# Patient Record
Sex: FEMALE | Race: WHITE | Employment: UNEMPLOYED | URBAN - METROPOLITAN AREA
[De-identification: names, ages, dates, MRNs, and addresses within clinical notes are randomized per-mention and may not be internally consistent; named-entity substitution may affect disease eponyms.]

---

## 2017-04-01 LAB
ANTIBODY SCREEN, EXTERNAL: NEGATIVE
CHLAMYDIA, EXTERNAL: NEGATIVE
GRBS, EXTERNAL: POSITIVE
HBSAG, EXTERNAL: NEGATIVE
HIV, EXTERNAL: NEGATIVE
PLATELET CNT,   EXTERNAL: 346
RPR, EXTERNAL: NEGATIVE
RUBELLA, EXTERNAL: NORMAL
TYPE, ABO & RH, EXTERNAL: NORMAL
URINALYSIS, EXTERNAL: NEGATIVE

## 2017-05-01 ENCOUNTER — ANESTHESIA (OUTPATIENT)
Dept: LABOR AND DELIVERY | Age: 42
DRG: 560 | End: 2017-05-01
Payer: MEDICAID

## 2017-05-01 ENCOUNTER — ANESTHESIA EVENT (OUTPATIENT)
Dept: LABOR AND DELIVERY | Age: 42
DRG: 560 | End: 2017-05-01
Payer: MEDICAID

## 2017-05-01 ENCOUNTER — HOSPITAL ENCOUNTER (INPATIENT)
Age: 42
LOS: 2 days | Discharge: HOME | DRG: 560 | End: 2017-05-03
Attending: SPECIALIST | Admitting: OBSTETRICS & GYNECOLOGY
Payer: MEDICAID

## 2017-05-01 LAB
ABO + RH BLD: NORMAL
BASOPHILS # BLD AUTO: 0 K/UL (ref 0–0.06)
BASOPHILS # BLD: 0 % (ref 0–2)
BLOOD GROUP ANTIBODIES SERPL: NORMAL
DIFFERENTIAL METHOD BLD: ABNORMAL
EOSINOPHIL # BLD: 0.1 K/UL (ref 0–0.4)
EOSINOPHIL NFR BLD: 1 % (ref 0–5)
ERYTHROCYTE [DISTWIDTH] IN BLOOD BY AUTOMATED COUNT: 13.2 % (ref 11.6–14.5)
HCT VFR BLD AUTO: 39.1 % (ref 35–45)
HGB BLD-MCNC: 13.5 G/DL (ref 12–16)
LYMPHOCYTES # BLD AUTO: 22 % (ref 21–52)
LYMPHOCYTES # BLD: 2.2 K/UL (ref 0.9–3.6)
MCH RBC QN AUTO: 32.4 PG (ref 24–34)
MCHC RBC AUTO-ENTMCNC: 34.5 G/DL (ref 31–37)
MCV RBC AUTO: 93.8 FL (ref 74–97)
MONOCYTES # BLD: 0.4 K/UL (ref 0.05–1.2)
MONOCYTES NFR BLD AUTO: 4 % (ref 3–10)
NEUTS SEG # BLD: 7.2 K/UL (ref 1.8–8)
NEUTS SEG NFR BLD AUTO: 73 % (ref 40–73)
PLATELET # BLD AUTO: 317 K/UL (ref 135–420)
PMV BLD AUTO: 9.3 FL (ref 9.2–11.8)
RBC # BLD AUTO: 4.17 M/UL (ref 4.2–5.3)
SPECIMEN EXP DATE BLD: NORMAL
WBC # BLD AUTO: 9.9 K/UL (ref 4.6–13.2)

## 2017-05-01 PROCEDURE — 65270000029 HC RM PRIVATE

## 2017-05-01 PROCEDURE — 74011000250 HC RX REV CODE- 250

## 2017-05-01 PROCEDURE — 76060000078 HC EPIDURAL ANESTHESIA

## 2017-05-01 PROCEDURE — 74011250636 HC RX REV CODE- 250/636

## 2017-05-01 PROCEDURE — 3E0S3CZ INTRODUCE REGIONAL ANESTH IN EPIDURAL SPACE, PERC: ICD-10-PCS | Performed by: ANESTHESIOLOGY

## 2017-05-01 PROCEDURE — 77030020255 HC SOL INJ LR 1000ML BG

## 2017-05-01 PROCEDURE — 85025 COMPLETE CBC W/AUTO DIFF WBC: CPT | Performed by: OBSTETRICS & GYNECOLOGY

## 2017-05-01 PROCEDURE — 75410000003 HC RECOV DEL/VAG/CSECN EA 0.5 HR

## 2017-05-01 PROCEDURE — 74011000250 HC RX REV CODE- 250: Performed by: OBSTETRICS & GYNECOLOGY

## 2017-05-01 PROCEDURE — 74011250637 HC RX REV CODE- 250/637: Performed by: OBSTETRICS & GYNECOLOGY

## 2017-05-01 PROCEDURE — 10907ZC DRAINAGE OF AMNIOTIC FLUID, THERAPEUTIC FROM PRODUCTS OF CONCEPTION, VIA NATURAL OR ARTIFICIAL OPENING: ICD-10-PCS | Performed by: OBSTETRICS & GYNECOLOGY

## 2017-05-01 PROCEDURE — 84112 EVAL AMNIOTIC FLUID PROTEIN: CPT

## 2017-05-01 PROCEDURE — 75410000000 HC DELIVERY VAGINAL/SINGLE

## 2017-05-01 PROCEDURE — 77030019938 HC TBNG IV PCA ICUM -A

## 2017-05-01 PROCEDURE — 0KQM0ZZ REPAIR PERINEUM MUSCLE, OPEN APPROACH: ICD-10-PCS | Performed by: OBSTETRICS & GYNECOLOGY

## 2017-05-01 PROCEDURE — 75410000002 HC LABOR FEE PER 1 HR

## 2017-05-01 PROCEDURE — 86900 BLOOD TYPING SEROLOGIC ABO: CPT | Performed by: OBSTETRICS & GYNECOLOGY

## 2017-05-01 PROCEDURE — 74011000258 HC RX REV CODE- 258: Performed by: OBSTETRICS & GYNECOLOGY

## 2017-05-01 PROCEDURE — 74011250636 HC RX REV CODE- 250/636: Performed by: OBSTETRICS & GYNECOLOGY

## 2017-05-01 RX ORDER — LIDOCAINE HYDROCHLORIDE 10 MG/ML
30 INJECTION, SOLUTION EPIDURAL; INFILTRATION; INTRACAUDAL; PERINEURAL AS NEEDED
Status: DISCONTINUED | OUTPATIENT
Start: 2017-05-01 | End: 2017-05-01 | Stop reason: HOSPADM

## 2017-05-01 RX ORDER — CASTOR OIL 100 %
60 OIL (ML) ORAL
Status: COMPLETED | OUTPATIENT
Start: 2017-05-01 | End: 2017-05-01

## 2017-05-01 RX ORDER — ROPIVACAINE HYDROCHLORIDE 2 MG/ML
INJECTION, SOLUTION EPIDURAL; INFILTRATION; PERINEURAL AS NEEDED
Status: DISCONTINUED | OUTPATIENT
Start: 2017-05-01 | End: 2017-05-01 | Stop reason: HOSPADM

## 2017-05-01 RX ORDER — OXYCODONE AND ACETAMINOPHEN 5; 325 MG/1; MG/1
2 TABLET ORAL
Status: DISCONTINUED | OUTPATIENT
Start: 2017-05-01 | End: 2017-05-03 | Stop reason: HOSPADM

## 2017-05-01 RX ORDER — ONDANSETRON 2 MG/ML
4 INJECTION INTRAMUSCULAR; INTRAVENOUS
Status: DISCONTINUED | OUTPATIENT
Start: 2017-05-01 | End: 2017-05-01 | Stop reason: HOSPADM

## 2017-05-01 RX ORDER — IBUPROFEN 400 MG/1
800 TABLET ORAL
Status: DISCONTINUED | OUTPATIENT
Start: 2017-05-01 | End: 2017-05-03 | Stop reason: HOSPADM

## 2017-05-01 RX ORDER — MAG HYDROX/ALUMINUM HYD/SIMETH 200-200-20
15 SUSPENSION, ORAL (FINAL DOSE FORM) ORAL
Status: DISCONTINUED | OUTPATIENT
Start: 2017-05-01 | End: 2017-05-01 | Stop reason: HOSPADM

## 2017-05-01 RX ORDER — TERBUTALINE SULFATE 1 MG/ML
0.25 INJECTION SUBCUTANEOUS
Status: DISCONTINUED | OUTPATIENT
Start: 2017-05-01 | End: 2017-05-01 | Stop reason: HOSPADM

## 2017-05-01 RX ORDER — AMOXICILLIN 250 MG
1 CAPSULE ORAL
Status: DISCONTINUED | OUTPATIENT
Start: 2017-05-01 | End: 2017-05-03 | Stop reason: HOSPADM

## 2017-05-01 RX ORDER — OXYTOCIN/RINGER'S LACTATE 20/1000 ML
500 PLASTIC BAG, INJECTION (ML) INTRAVENOUS ONCE
Status: DISCONTINUED | OUTPATIENT
Start: 2017-05-01 | End: 2017-05-01 | Stop reason: HOSPADM

## 2017-05-01 RX ORDER — FENTANYL CITRATE 50 UG/ML
INJECTION, SOLUTION INTRAMUSCULAR; INTRAVENOUS
Status: DISPENSED
Start: 2017-05-01 | End: 2017-05-02

## 2017-05-01 RX ORDER — OXYTOCIN 10 [USP'U]/ML
10 INJECTION, SOLUTION INTRAMUSCULAR; INTRAVENOUS
Status: DISCONTINUED | OUTPATIENT
Start: 2017-05-01 | End: 2017-05-01 | Stop reason: HOSPADM

## 2017-05-01 RX ORDER — OXYTOCIN/RINGER'S LACTATE 20/1000 ML
125 PLASTIC BAG, INJECTION (ML) INTRAVENOUS CONTINUOUS
Status: DISCONTINUED | OUTPATIENT
Start: 2017-05-01 | End: 2017-05-01 | Stop reason: HOSPADM

## 2017-05-01 RX ORDER — HYDROCORTISONE 25 MG/G
1 CREAM TOPICAL AS NEEDED
Status: DISCONTINUED | OUTPATIENT
Start: 2017-05-01 | End: 2017-05-03 | Stop reason: HOSPADM

## 2017-05-01 RX ORDER — CARBOPROST TROMETHAMINE 250 UG/ML
250 INJECTION, SOLUTION INTRAMUSCULAR
Status: DISCONTINUED | OUTPATIENT
Start: 2017-05-01 | End: 2017-05-01 | Stop reason: HOSPADM

## 2017-05-01 RX ORDER — NALBUPHINE HYDROCHLORIDE 10 MG/ML
10 INJECTION, SOLUTION INTRAMUSCULAR; INTRAVENOUS; SUBCUTANEOUS
Status: DISCONTINUED | OUTPATIENT
Start: 2017-05-01 | End: 2017-05-01 | Stop reason: HOSPADM

## 2017-05-01 RX ORDER — OXYTOCIN IN 5 % DEXTROSE 30/500 ML
.5-2 PLASTIC BAG, INJECTION (ML) INTRAVENOUS
Status: DISCONTINUED | OUTPATIENT
Start: 2017-05-01 | End: 2017-05-01 | Stop reason: HOSPADM

## 2017-05-01 RX ORDER — METHYLERGONOVINE MALEATE 0.2 MG/ML
0.2 INJECTION INTRAVENOUS AS NEEDED
Status: DISCONTINUED | OUTPATIENT
Start: 2017-05-01 | End: 2017-05-01 | Stop reason: HOSPADM

## 2017-05-01 RX ORDER — SODIUM CHLORIDE, SODIUM LACTATE, POTASSIUM CHLORIDE, CALCIUM CHLORIDE 600; 310; 30; 20 MG/100ML; MG/100ML; MG/100ML; MG/100ML
125 INJECTION, SOLUTION INTRAVENOUS CONTINUOUS
Status: DISCONTINUED | OUTPATIENT
Start: 2017-05-01 | End: 2017-05-01 | Stop reason: HOSPADM

## 2017-05-01 RX ORDER — ZOLPIDEM TARTRATE 5 MG/1
5 TABLET ORAL
Status: DISCONTINUED | OUTPATIENT
Start: 2017-05-01 | End: 2017-05-03 | Stop reason: HOSPADM

## 2017-05-01 RX ORDER — MISOPROSTOL 200 UG/1
800 TABLET ORAL
Status: DISCONTINUED | OUTPATIENT
Start: 2017-05-01 | End: 2017-05-01 | Stop reason: HOSPADM

## 2017-05-01 RX ORDER — HYDROMORPHONE HYDROCHLORIDE 1 MG/ML
1 INJECTION, SOLUTION INTRAMUSCULAR; INTRAVENOUS; SUBCUTANEOUS
Status: DISCONTINUED | OUTPATIENT
Start: 2017-05-01 | End: 2017-05-01 | Stop reason: HOSPADM

## 2017-05-01 RX ORDER — FENTANYL CITRATE 50 UG/ML
100 INJECTION, SOLUTION INTRAMUSCULAR; INTRAVENOUS ONCE
Status: DISCONTINUED | OUTPATIENT
Start: 2017-05-01 | End: 2017-05-01 | Stop reason: HOSPADM

## 2017-05-01 RX ORDER — PROMETHAZINE HYDROCHLORIDE 25 MG/ML
25 INJECTION, SOLUTION INTRAMUSCULAR; INTRAVENOUS
Status: DISCONTINUED | OUTPATIENT
Start: 2017-05-01 | End: 2017-05-03 | Stop reason: HOSPADM

## 2017-05-01 RX ORDER — ACETAMINOPHEN 325 MG/1
650 TABLET ORAL
Status: DISCONTINUED | OUTPATIENT
Start: 2017-05-01 | End: 2017-05-03 | Stop reason: HOSPADM

## 2017-05-01 RX ADMIN — Medication 10 ML/HR: at 15:09

## 2017-05-01 RX ADMIN — CASTOR OIL 60 ML: 100 LIQUID ORAL at 18:28

## 2017-05-01 RX ADMIN — SODIUM CHLORIDE, SODIUM LACTATE, POTASSIUM CHLORIDE, AND CALCIUM CHLORIDE 125 ML/HR: 600; 310; 30; 20 INJECTION, SOLUTION INTRAVENOUS at 10:50

## 2017-05-01 RX ADMIN — CLINDAMYCIN PHOSPHATE 900 MG: 150 INJECTION, SOLUTION, CONCENTRATE INTRAVENOUS at 12:02

## 2017-05-01 RX ADMIN — Medication 2 MILLI-UNITS/MIN: at 12:08

## 2017-05-01 RX ADMIN — SODIUM CHLORIDE, SODIUM LACTATE, POTASSIUM CHLORIDE, AND CALCIUM CHLORIDE 125 ML/HR: 600; 310; 30; 20 INJECTION, SOLUTION INTRAVENOUS at 13:30

## 2017-05-01 RX ADMIN — SODIUM CHLORIDE 1000 MG: 900 INJECTION, SOLUTION INTRAVENOUS at 13:44

## 2017-05-01 RX ADMIN — ROPIVACAINE HYDROCHLORIDE 10 ML: 2 INJECTION, SOLUTION EPIDURAL; INFILTRATION; PERINEURAL at 14:57

## 2017-05-01 NOTE — IP AVS SNAPSHOT
Mary Foil 
 
 
 [address] [place] [address] Patient: Leonor Cuevas MRN: [id number] You are allergic to the following Allergen Reactions Pcn (Penicillins) Unknown (comments) Immunizations Administered for This Admission Name Date Tdap  Deferred () Recent Documentation Height Weight Breastfeeding? BMI OB Status Smoking Status 1.727 m 82.6 kg Yes 27.67 kg/m2 Recent pregnancy Current Every Day Smoker Emergency Contacts Name Discharge Info Relation Home Work Mobile [facility] DISCHARGE CAREGIVER [3] Mother [14] [phone] About your hospitalization You were admitted on:  May 1, 2017 You last received care in the:  [place] [address] You were discharged on:  May 3, 2017 Unit phone number:  [phone] Why you were hospitalized Your primary diagnosis was:  , Delivered Your diagnoses also included:  Labor And Delivery, Indication For Care Providers Seen During Your Hospitalizations Provider Role Specialty Primary office phone Noa Villegas MD Attending Provider Obstetrics & Gynecology [phone] Your Primary Care Physician (PCP) Primary Care Physician Office Phone Office Fax Jn Eaton [phone] Follow-up Information Follow up With Details Comments Contact Info Noa Villegas MD In 6 weeks  [address]  [address] [place] [address] 
[phone] Current Discharge Medication List  
  
START taking these medications Dose & Instructions Dispensing Information Comments Morning Noon Evening Bedtime  
 ibuprofen 800 mg tablet Commonly known as:  MOTRIN Your last dose was: Your next dose is:    
   
   
 Dose:  800 mg Take 1 Tab by mouth every eight (8) hours as needed. Quantity:  60 Tab Refills:  0 CONTINUE these medications which have NOT CHANGED Dose & Instructions Dispensing Information Comments Morning Noon Evening Bedtime PRENATAL VITAMIN 1+1 PO Your last dose was: Your next dose is: Take  by mouth. Refills:  0 Where to Get Your Medications Information on where to get these meds will be given to you by the nurse or doctor. ! Ask your nurse or doctor about these medications  
  ibuprofen 800 mg tablet Discharge Instructions CONGRATULATIONS ON THE BIRTH OF YOUR BABY! The first six weeks after childbirth is a time of physical and emotional adjustment. This handout will help to answer questions and provide guidance during the postpartum period. Every family's adjustment is unique, so please call if you have further concerns. DIET Your body is in need of a well-balanced, high protein diet to recuperate from birth. Please continue to take your prenatal vitamins for 6 weeks or as long as you are breastfeeding. Continue to drink at least 6-8 cups of water or other liquid a day. A breastfeeding mother also needs extra protein, calories and calcium containing foods. It is a good rule to drink fluids with every feeding in order to maintain an adequate milk supply and avoid dehydration. Your baby will probably not be bothered by things in your diet, but if the baby seems extremely fussy or develops a rash, you may want to discuss possible food intolerances with your baby's care provider. PAIN MEDICATIONS Acetaminophen (Tylenol), ibuprofen (Motrin), or other prescribed pain medication may be taken as directed to relieve discomfort. The above medications pass in very minimal amounts into the breast milk and usually will not cause problems. There are medications that may affect the baby, so please consult your baby's care provider before taking medication.   If you are breastfeeding, be sure to mention this to any care provider you see so that medications that are safe may be selected. There is an excellent resource called IID that is a resource for medication safety in pregnancy and lactation. You can visit their website at Data Elite/ or call them toll free at [phone] if you have any questions about medication safety. UTERINE INVOLUTION / VAGINAL BLEEDING Involution is the process of the uterus returning to pre-pregnant size. It will take approximately six weeks for this process to occur. To achieve this size your uterus becomes firm to slow bleeding loss from the placental site. The first 7 days after birth, the bleeding is red and heavy. It may change with your activity and position. Some small clots are normal.   After ten days, the bleeding should be pale pink and slowed considerably. The next several weeks may progress to a pink, mucousy discharge. This may continue for 6-8 weeks, depending on your activity. During the first four weeks after delivery we recommend using sanitary pads instead of tampons. Douching should also be avoided, but it is fine to take a tub bath so long as the tub is very clean. ACTIVITY/EXERCISE Adequate rest is essential to recovery. Try to rest or sleep when the baby sleeps. After two weeks, you may begin going for short walks, doing Kegel exercises and abdominal crunches. Avoid heavy, jarring or aerobic exercises. Remember to start out slowly and build up to your previous fitness level. Use common sense and don't overdo as rest is important and the benefits of increased rest are a quicker recovery. For the first two weeks after a  try to limit trips up or down steps. Do not lift anything heavier than the baby during this time.   Lifting the baby or other objects should be done by bending at the knees rather than the waist.  Driving should be avoided during the first two to three weeks until you have the strength to push firmly on the brakes in case of an emergency. You may ride as a passenger, but DO wear a seat belt at all times. After a few weeks, you may resume normal activity at whatever pace is comfortable for you. Exercise may also be resumed gradually. Walking is a good way to start. Finally, try to be reasonable in your expectations. Caring for a new baby after major surgery can be quite trying. Arrange for assistance at home to ensure that you get enough rest.  
 
POSTPARTUM CHECK You may call the office when you return home to set up a postpartum visit. Most patients will be seen at 6 weeks after delivery, but after a  or other circumstances you may be seen in 2 weeks or less. If you are discharged from the hospital with staples that must be removed, you will be asked to come in sooner. At your postpartum visit, a pelvic exam may be performed. If you are having any problems or concerns, please do not hesitate to call. MOOD CHANGES Significant hormonal changes occur in the days following delivery, and as a result, many women experience brief episodes of tearfulness or feeling \"blue. \"  These emotional swings may be made worse by lack of sleep and by the adjustments inherent in becoming a mother. For some women, these fluctuations are minor. For others, they are overwhelming; creating feelings of anxiety, depression, or the inability to cope. If you have difficulty functioning as a result of feeling down, or if the mood changes seem severe, do not improve, or result is thoughts of harming yourself or others CALL RIGHT AWAY. PERINEAL CARE The basic goals of perineal care are to prevent infection, to relieve pain and promote healing. Your stitches will dissolve in four to six weeks, and do not need to be removed. After urinating, please continue to clean with warm water from front to back.   Please continue sitz baths as instructed twice a day for a week or as needed. Call the office if you see pus in the suture site, or have unusual or severe swelling or pain that seems to be getting worse. INCISION CARE If you had a , clean and dry the incision gently as you would the rest of your body. Washing over the area with soap and water, and showering are fine. If steri-strips are present they will gradually come off with time. Tub baths are permitted. You may experience numbness and burning in the area surrounding the incision which usually resolves gradually over the next several weeks or months. RETURN OF MENSTRUATION Your first menstrual period may occur as soon as four to six weeks after your delivery if you are not breast-feeding. If breast-feeding it is more difficult to predict when your first period will occur. Even if you are not yet menstruating, you may be ovulating and it may be possible to conceive again. It is common for your first period after childbirth to be very heavy with an increased amount of cramping. BREASTS Breast-feeding Mothers: Colostrum is excreted in the first 24-72 hours. Mature breast milk will appear on the 2nd to 5th day. Engorgement may occur with the mature milk making your breasts feel warm and very full. Frequent feedings will make you more comfortable. Babies do not nurse on regular schedules. Nursing every 1 1/2 to 2 hours is normal and frequent feeding DOES NOT mean you are not making enough milk. To avoid nipple confusion, do not give bottles for the first 4 weeks. Growth spurts are common and may require more frequent feedings. This is the way baby increases your milk supply. During a growth spurt, you may feel you are feeding very frequently and that your breasts are \"empty. \"  Don't worry, your milk is produced by supply and demand so this increased frequency of feeding will increase your milk supply within 48 hours.   Sore nipples may occur with frequent feedings and are sometimes also caused by improper latch. Check for a proper latch. Baby should have a wide open mouth. Use different positions at each feeding if possible. Express a small amount of colostrum or breast milk onto the sore area and leave bra flaps unlatched until dry. The lactation consultant at [facility] is available for outpatient consultation without charge. Call [phone] from Monday-Friday 9:00am- 3:00pm to arrange an outpatient appointment with her. Local [facility] and consultants may also be very helpful. If You Are Not Breast-feeding: You will experience swelling, engorgement and some milk production. There are no safe medications available to stop lactation. Some remedies for engorgement include: wearing a tight bra, ice packs and cold green cabbage leaves placed between the breast and your bra. Change these frequently. Tylenol or Motrin should help with the discomfort. SEXUAL ADJUSTMENTS We recommend that you wait at least four weeks before resuming sexual intercourse. A sore perineum, a demanding baby and fatigue will certainly affect your ability to enjoy lovemaking! A vaginal lubricant is recommended to help with any dryness. It is very important to remember that you will ovulate BEFORE your first period and can conceive. If you do not wish another pregnancy right away, please take precautions to avoid pregnancy. If you would like a prescription method of birth control, please discuss this with us at your 6 week visit. ELIMINATION We remind all postpartum patients that it may take a few days for your bowels to return to normal, especially if you had a long labor. For those who had C-sections or severe lacerations, we recommend that you use a stool softener twice daily for at least two weeks. Many stool softeners are over-the-counter. Colace (Docusate Sodium) is recommended.   Bulk forming agents such as Metamucil or Rosangela Edge may be used daily in addition to a stool softener to promote regular bowel movements. Eating fresh fruits and vegetables along with whole grains is helpful as well. Do not be afraid to have a bowel movement as your stitches will not \"come out\" in the course of having a bowel movement. Urination may be difficult due to soreness around the urethra, or as an after effect of epidural.  This is temporary and can be helped  by squirting water over the perineum or try going in the shower. Hemorrhoids are common after birth. Tucks pads, Anusol cream and avoiding constipation are helpful. If constipation does occur, you may take Milk of Magnesia or Senekot according to the package instructions. DANGER SIGNS! CALL WITHOUT DELAY IF YOU ARE EXPERIENCING ANY OF THE FOLLOWING: 
* Unusually heavy bleeding, soaking more than 1 or more pads in an hour. * Vaginal discharge with strong foul odor. * Fever of 101 or higher * Unusual pain or tenderness in the abdominal area. * If breasts are red, hot or have a painful lump. * Depression that persists longer than 1-2 weeks or is severe. * Any urinary frequency accompanied by urgency or pain. * A lump in leg or calf especially if painful, warm or red. We thank you for choosing us for your prenatal care and/or delivery. We wish you all happiness and health with your baby for his or her lifetime! Patient armband removed and shredded Discharge Instructions Attachments/References DEPRESSION: POSTPARTUM (ENGLISH) Discharge Orders None [facility] Announcement We are excited to announce that we are making your provider's discharge notes available to you in [employer]. You will see these notes when they are completed and signed by the physician that discharged you from your recent hospital stay.   If you have any questions or concerns about any information you see in [employer], please call the Health Information Department where you were seen or reach out to your Primary Care Provider for more information about your plan of care. Introducing [facility]! Tamela Gonsales introduces [employer] patient portal. Now you can access parts of your medical record, email your doctor's office, and request medication refills online. 1. In your internet browser, go to [email or web address] 2. Click on the First Time User? Click Here link in the Sign In box. You will see the New Member Sign Up page. 3. Enter your [employer] Access Code exactly as it appears below. You will not need to use this code after youve completed the sign-up process. If you do not sign up before the expiration date, you must request a new code. · [employer] Access Code: [email or web address] Expires: 5/18/2017  2:09 PM 
 
4. Enter the last four digits of your Social Security Number (xxxx) and Date of Birth (mm/dd/yyyy) as indicated and click Submit. You will be taken to the next sign-up page. 5. Create a [employer] ID. This will be your [employer] login ID and cannot be changed, so think of one that is secure and easy to remember. 6. Create a [employer] password. You can change your password at any time. 7. Enter your Password Reset Question and Answer. This can be used at a later time if you forget your password. 8. Enter your e-mail address. You will receive e-mail notification when new information is available in [address] 9. Click Sign Up. You can now view and download portions of your medical record. 10. Click the Download Summary menu link to download a portable copy of your medical information. If you have questions, please visit the Frequently Asked Questions section of the [employer] website. Remember, [employer] is NOT to be used for urgent needs. For medical emergencies, dial 911. Now available from your iPhone and Android! General Information Please provide this summary of care documentation to your next provider. Patient Signature:  ____________________________________________________________ Date:  ____________________________________________________________  
  
Colt Whitney Provider Signature:  ____________________________________________________________ Date:  ____________________________________________________________ More Information Depression After Childbirth: Care Instructions Your Care Instructions Many women get the \"baby blues\" during the first few days after childbirth. You may lose sleep, feel irritable, and cry easily. You may feel happy one minute and sad the next. Hormone changes are one cause of these emotional changes. Also, the demands of a new baby, along with visits from relatives or other family needs, add to a mother's stress. The \"baby blues\" often peak around the fourth day. Then they ease up in less than 2 weeks. If your moodiness or anxiety lasts for more than 2 weeks, or if you feel like life is not worth living, you may have postpartum depression. This is different for each mother. Some mothers with serious depression may worry intensely about their infant's well-being. Others may feel distant from their child. Some mothers might even feel that they might harm their baby. A mother may have signs of paranoia, wondering if someone is watching her. Depression is not a sign of weakness. It is a medical condition that requires treatment. Medicine and counseling often work well to reduce depression. Talk to your doctor about taking antidepressant medicine while breastfeeding. Follow-up care is a key part of your treatment and safety. Be sure to make and go to all appointments, and call your doctor if you are having problems. It's also a good idea to know your test results and keep a list of the medicines you take. How do you know if you are depressed? With all the changes in your life, you may not know if you are depressed. Pregnancy sometimes causes changes in how you feel that are similar to the symptoms of depression. Symptoms of depression include: · Feeling sad or hopeless and losing interest in daily activities. These are the most common symptoms of depression. · Sleeping too much or not enough. · Feeling tired. You may feel as if you have no energy. · Eating too much or too little. · Writing or talking about death, such as writing suicide notes or talking about guns, knives, or pills. Keep the numbers for these national suicide hotlines: 2-792-060-TALK (1-453.990.8459) and 5-678-FJBLBGW (0-208.967.6217). If you or someone you know talks about suicide or feeling hopeless, get help right away. How can you care for yourself at home? · Be safe with medicines. Take your medicines exactly as prescribed. Call your doctor if you think you are having a problem with your medicine. · Eat a healthy diet so that you can keep up your energy. · Get regular daily exercise, such as walks, to help improve your mood. · Get as much sunlight as possible. Keep your shades and curtains open. Get outside as much as you can. · Avoid using alcohol or other substances to feel better. · Get as much rest and sleep as possible. Avoid doing too much. Being too tired can increase depression. · Play stimulating music throughout your day and soothing music at night. · Schedule outings and visits with friends and family. Ask them to call you regularly, so that you do not feel alone. · Ask for help with preparing food and other daily tasks. Family and friends are often happy to help a mother with a . · Be honest with yourself and those who care about you. Tell them about your struggle. · Join a support group of new mothers. No one can better understand the challenges of caring for a  than other new mothers. · If you feel like life is not worth living or are feeling hopeless, get help right away. Keep the numbers for these national suicide hotlines: 3-712-984-TALK (0-216.885.2282) and 5-297-EJUGMRS (2-758.418.9010). When should you call for help? Call 911 anytime you think you may need emergency care. For example, call if: 
· You feel you cannot stop from hurting yourself, your baby, or someone else. Call your doctor now or seek immediate medical care if: 
· You are having trouble caring for yourself or your baby. · You hear voices. Watch closely for changes in your health, and be sure to contact your doctor if: 
· You have problems with your depression medicine. · You do not get better as expected. Where can you learn more? Go to http://power-chaz.info/. Enter C104 in the search box to learn more about \"Depression After Childbirth: Care Instructions. \" Current as of: July 26, 2016 Content Version: 11.2 © 9637-5888 [employer]. Care instructions adapted under license by [employer] (which disclaims liability or warranty for this information). If you have questions about a medical condition or this instruction, always ask your healthcare professional. [address] any warranty or liability for your use of this information.

## 2017-05-01 NOTE — ANESTHESIA PREPROCEDURE EVALUATION
Anesthetic History   No history of anesthetic complications            Review of Systems / Medical History  Patient summary reviewed and pertinent labs reviewed    Pulmonary          Smoker      Comments:   Risk Factors for Postoperative nausea/vomiting:       History of postoperative nausea/vomiting? NO       Female? YES       Motion sickness? NO       Intended opioid administration for postoperative analgesia?   NO   Neuro/Psych   Within defined limits           Cardiovascular                       GI/Hepatic/Renal     GERD           Endo/Other        Anemia     Other Findings   Comments:            Physical Exam    Airway  Mallampati: III  TM Distance: 4 - 6 cm  Neck ROM: normal range of motion   Mouth opening: Diminished (comment)     Cardiovascular    Rhythm: regular  Rate: normal         Dental    Dentition: Poor dentition     Pulmonary  Breath sounds clear to auscultation               Abdominal  GI exam deferred       Other Findings            Anesthetic Plan    ASA: 2  Anesthesia type: epidural            Anesthetic plan and risks discussed with: Patient

## 2017-05-01 NOTE — PROGRESS NOTES
Pt is 42yo, , GA 40 3/7, arrives ambulatory to unit for complaints of contractions since 0100 this am, increasing in intensity around 0500. Reports watery blood tinged discharge since sometime on  for which she has been wearing a pad. States contractions are every 4-5 minutes, rates pain 5/10. Endorses normal fetal movement. Pt placed on EFM and toco, SVE done, pt is 1.5/50/-1. Amnisure test performed, results positive confirming rupture. Will notify Dr. Germain Crowley of pts arrival.    [phone]: Discussed plan of care with Dr. Germain Crowley by phone. Informed pt is a TOLAC and is GBS positive with penicillin allergy. Orders received to admit patient and begin pitocin augmentation and begin clindamycin for GBS status.

## 2017-05-01 NOTE — H&P
History & Physical    Name: Justine Briceño MRN: [id number]  SSN: [id number]    YOB: 1975  Age: 39 y.o. Sex: female        Subjective:     Estimated Date of Delivery: 17  OB History      Para Term  AB TAB SAB Ectopic Multiple Living    3 1 1  1  1   1        Elizabeth is a 41yo  at 40.3 weeks presenting with irregular contractions. Reports leakage of fluid since Saturday. No bleeding. Amniosure positive on admission. PNC with Dr Rip Schaefer. Had previous CS for transverse presentation. GBS pos but pcn allergic (throat swells) and clinda resistant. AMA and NIPT with XY fetus. Past Medical History:   Diagnosis Date    Breech presentation 2014     Past Surgical History:   Procedure Laterality Date     DELIVERY ONLY      HX TONSILLECTOMY       Social History     Occupational History    Not on file. Social History Main Topics    Smoking status: Current Every Day Smoker     Packs/day: 0.50     Years: 12.00    Smokeless tobacco: Not on file    Alcohol use No    Drug use: No    Sexual activity: Yes     Partners: Male     Birth control/ protection: None     No family history on file. Allergies   Allergen Reactions    Pcn [Penicillins] Unknown (comments)     Prior to Admission medications    Medication Sig Start Date End Date Taking? Authorizing Provider   PNV WITH CA,NO.71/IRON/FA (PRENATAL VITAMIN 1+1 PO) Take  by mouth. Historical Provider        Review of Systems: A comprehensive review of systems was negative except for that written in the HPI.     Objective:     Vitals:  Vitals:    17 1028 17 1209 17 1235   BP: 124/81     Pulse: 95     Resp: 16     Temp: 98.2 °F (36.8 °C) 98.3 °F (36.8 °C)    SpO2: 99%     Weight:   82.6 kg (182 lb)   Height:   5' 8\" (1.727 m)        Labs:  Recent Results (from the past 12 hour(s))   CBC WITH AUTOMATED DIFF    Collection Time: 17 11:50 AM   Result Value Ref Range    WBC 9.9 4.6 - 13.2 K/uL RBC 4.17 (L) 4.20 - 5.30 M/uL    HGB 13.5 12.0 - 16.0 g/dL    HCT 39.1 35.0 - 45.0 %    MCV 93.8 74.0 - 97.0 FL    MCH 32.4 24.0 - 34.0 PG    MCHC 34.5 31.0 - 37.0 g/dL    RDW 13.2 11.6 - 14.5 %    PLATELET 667 244 - 663 K/uL    MPV 9.3 9.2 - 11.8 FL    NEUTROPHILS 73 40 - 73 %    LYMPHOCYTES 22 21 - 52 %    MONOCYTES 4 3 - 10 %    EOSINOPHILS 1 0 - 5 %    BASOPHILS 0 0 - 2 %    ABS. NEUTROPHILS 7.2 1.8 - 8.0 K/UL    ABS. LYMPHOCYTES 2.2 0.9 - 3.6 K/UL    ABS. MONOCYTES 0.4 0.05 - 1.2 K/UL    ABS. EOSINOPHILS 0.1 0.0 - 0.4 K/UL    ABS. BASOPHILS 0.0 0.0 - 0.06 K/UL    DF AUTOMATED     TYPE & SCREEN    Collection Time: 05/01/17 11:50 AM   Result Value Ref Range    Crossmatch Expiration 05/04/2017     ABO/Rh(D) PENDING     Antibody screen PENDING        Physical Exam:  Abdomen: gravid, nontender  Membranes:  Ruptured-clear fluid  Fetal Heart Rate: reassuring  TOCO q 1-3 min  Cervix 2/80/-1 vertex    Prenatal Labs:   Lab Results   Component Value Date/Time    Rubella, External immune 04/01/2017    GrBStrep, External positive  04/01/2017    HBsAg, External negative 04/01/2017    HIV, External negative 04/01/2017    RPR, External negative 04/01/2017    Chlamydia, External negative 04/01/2017         Assessment/Plan:     IUP at 40 weeks  Prolonged rupture of membranes  GBS pos-pcn allergic and clinda resistant  Previous CS-desires NORMA    Will start vancomycin for GBS  Discussed pitocin augmentation since prolonged rupture  Reviewed NORMA and risk of uterine rupture. She wants to proceed with pitocin and possible vaginal delivery.     Signed By:  Jamie Arellano MD     May 1, 2017

## 2017-05-01 NOTE — ANESTHESIA PROCEDURE NOTES
Epidural Block    Start time: 5/1/2017 2:44 PM  End time: 5/1/2017 3:05 PM  Performed by: Yusra Mulligan  Authorized by: Yusra Mulligan     Pre-Procedure  Indication: labor epidural    Preanesthetic Checklist: patient identified, risks and benefits discussed, anesthesia consent, site marked, patient being monitored, timeout performed and anesthesia consent    Timeout Time: 14:47        Epidural:   Patient position:  Seated  Prep region:  Lumbar  Prep: Chlorhexidine    Location:  L3-4    Needle and Epidural Catheter:   Needle Type:  Tuohy  Needle Gauge:  18 G  Injection Technique:  Loss of resistance using air  Attempts:  1  Catheter Size:  20 G  Catheter at Skin Depth (cm):  11  Depth in Epidural Space (cm):  6.5  Events: no blood with aspiration, no cerebrospinal fluid with aspiration, no paresthesia and negative aspiration test    Test Dose:  Lidocaine 1.5% w/ epi and negative    Assessment:   Catheter Secured:  Tegaderm and tape  Insertion:  Uncomplicated  Patient tolerance:  Patient tolerated the procedure well with no immediate complications  VS from flow sheet

## 2017-05-01 NOTE — PROGRESS NOTES
Labor progress note      Here to evaluate labor progress      Vitals:  Visit Vitals    /77    Pulse 73    Temp 98.2 °F (36.8 °C)    Resp 16    Ht 5' 8\" (1.727 m)    Wt 82.6 kg (182 lb)    SpO2 100%    Breastfeeding Yes    BMI 27.67 kg/m2       Temp (24hrs), Av.2 °F (36.8 °C), Min:98.2 °F (36.8 °C), Max:98.3 °F (36.8 °C)      Labs:  WBC   Date/Time Value Ref Range Status   2017 11:50 AM 9.9 4.6 - 13.2 K/uL Final   10/27/2016 01:45 PM 7.8 4.6 - 13.2 K/uL Final   2014 02:00 AM 16.8 (H) 4.6 - 13.2 K/uL Final     HGB   Date/Time Value Ref Range Status   2017 11:50 AM 13.5 12.0 - 16.0 g/dL Final   10/27/2016 01:45 PM 12.7 12.0 - 16.0 g/dL Final   2014 12:34 PM 11.3 (L) 12.0 - 16.0 g/dL Final     PLATELET   Date/Time Value Ref Range Status   2017 11:50  135 - 420 K/uL Final     Platelet cnt., External   Date/Time Value Ref Range Status   2017 346  Final           Physical Exam:  Cervical Exam:  C/C +2  Membranes:  AROM forebag-clear  Uterine Activity: q1-2  Fetal Heart Rate: reassuring      Assessment:   Ready to push    Plan:   Anticipate LUIS Sandoval MD

## 2017-05-01 NOTE — L&D DELIVERY NOTE
Delivery Summary    Patient: Jose Alejandre MRN: [id number]  SSN: [id number]    YOB: 1975  Age: 39 y.o. Sex: female        Labor Events:    Labor: No    Rupture Date: 2017    Rupture Time: 12:00 PM    Rupture Type SROM    Amniotic Fluid Volume:       Amniotic Fluid Description: Clear  None    Induction: None         Augmentation: Oxytocin    Labor Complications: None     Additional Complications:        Cervical Ripening:       None      Delivery Events:  Episiotomy: None    Laceration(s): Second degree perineal       Repaired: Yes     Number of Repair Packets: 1    Suture Type and Size: Vicryl 3-0        Estimated Blood Loss (ml):   300       Information for the patient's :  Kelly Villa [[id number]]     Delivery Summary - Baby    Delivery Date: 2017   Delivery Time: 6:11 PM   Delivery Type: Vaginal Birth After    Sex:  male  Gestational Age: 40w3d  Delivery Clinician:  Bobby Coates  Living?: Yes   Delivery Location: L&D             APGARS  One minute Five minutes Ten minutes   Skin Color: 1    1       Heart Rate: 2   2         Reflex Irritability: 2   2         Muscle Tone: 2   2       Respiration: 2   2         Total: 9   9           Presentation: Vertex  Position:        Resuscitation Method:        Meconium Stained:      Cord Information:     Complications: None  Cord Blood Sent?:  Yes    Blood Gases Sent?:  No    Placenta:  Date/Time:   6:21 PM  Removal: Manual Removal      Appearance: Adherent      Measurements:  Birth Weight:      Birth Length:     Head Circumference:       Chest Circumference:      Abdominal Girth:       Other Providers:   Malorie SAUNDERS;YANA MARAVILLA;SHI GOOD;;;DIXIE LOZOYA Obstetrician;Primary Nurse;Primary [place] Nurse;Nicu Nurse;Neonatologist;Anesthesiologist;Crna;Nurse Practitioner;Midwife;Nursery Nurse           Cord Blood Results:  Information for the patient's :  Demond Farah, JULIETA Paige [[id number]]   No results found for: Júnior Docker, PCTDIG, BILI, ABORHEXT, [address]    Information for the patient's :  [facility] [[id number]]   No results found for: APH, APCO2, APO2, AHCO3, ABEC, ABDC, O2ST, SITE, RSCOM, PHI, PCO2I, PO2I, HCO3I, SO2I, IBD     Information for the patient's :  [facility] [[id number]]   No results found for: EPHV, PCO2V, PO2V, HCO3V, O2STV, EBDV    Pushed with epidural anesthesia and delivered a VMI with Apgars of 9 and 9. Placenta delivered in pieces as fundal portion adherent to uterus. Manual extraction performed and all tissue removed from uterus. Second degree perineal laceration repaired. .   Yary Sommer MD

## 2017-05-02 LAB
HCT VFR BLD AUTO: 31.8 % (ref 35–45)
HGB BLD-MCNC: 10.2 G/DL (ref 12–16)

## 2017-05-02 PROCEDURE — 85014 HEMATOCRIT: CPT | Performed by: SPECIALIST

## 2017-05-02 PROCEDURE — 65270000029 HC RM PRIVATE

## 2017-05-02 PROCEDURE — 74011250637 HC RX REV CODE- 250/637: Performed by: SPECIALIST

## 2017-05-02 PROCEDURE — 85018 HEMOGLOBIN: CPT | Performed by: SPECIALIST

## 2017-05-02 PROCEDURE — 36415 COLL VENOUS BLD VENIPUNCTURE: CPT | Performed by: SPECIALIST

## 2017-05-02 RX ADMIN — IBUPROFEN 800 MG: 400 TABLET, FILM COATED ORAL at 01:03

## 2017-05-02 RX ADMIN — OXYCODONE HYDROCHLORIDE AND ACETAMINOPHEN 2 TABLET: 5; 325 TABLET ORAL at 23:49

## 2017-05-02 RX ADMIN — IBUPROFEN 800 MG: 400 TABLET, FILM COATED ORAL at 17:22

## 2017-05-02 RX ADMIN — IBUPROFEN 800 MG: 400 TABLET, FILM COATED ORAL at 09:10

## 2017-05-02 NOTE — ANESTHESIA POSTPROCEDURE EVALUATION
Post-Anesthesia Evaluation and Assessment    Patient: Omayra Hickey MRN: [id number]  SSN: [id number]    YOB: 1975  Age: 39 y.o. Sex: female      Data from PACU flowsheet    Cardiovascular Function/Vital Signs  Visit Vitals    /65 (BP 1 Location: Right arm, BP Patient Position: At rest)    Pulse 92    Temp 36.8 °C (98.3 °F)    Resp 18    Ht 5' 8\" (1.727 m)    Wt 82.6 kg (182 lb)    SpO2 98%    Breastfeeding Yes    BMI 27.67 kg/m2       Patient is status post anesthesia    Nausea/Vomiting: controlled    Postoperative hydration reviewed and adequate. Pain:  Managed    Neurological Status: At baseline    Mental Status and Level of Consciousness: Alert and oriented     Pulmonary Status:   Adequate oxygenation and airway patent    Complications related to anesthesia: None    Post-anesthesia assessment completed.  No concerns    Signed By: Kareem Cohen CRNA     May 2, 2017

## 2017-05-02 NOTE — ROUTINE PROCESS
Verbal shift change report given to Edmar Car RN (oncoming nurse) by Pablo Slaughter RN (offgoing nurse). Report included the following information SBAR and MAR.

## 2017-05-02 NOTE — LACTATION NOTE
Mom states baby is latching and nursing well. Nursed well right after delivery. Reviewed latch. Experienced mom, no questions at this time. Info sheet and daily log given. Encouraged to call with questions. Offered help with feedings.

## 2017-05-02 NOTE — ROUTINE PROCESS
Verbal shift change report given to Charles Gaytan RN (oncoming nurse) by Kay Talley RN (offgoing nurse). Report included the following information Kardex, Intake/Output, MAR and Recent Results. 0900--assessment done--shower encouraged--voiding without difficulty--denies weakness when up--effective pain management with Motrin--breast feeding is going well--po fluids encouraged. 1845--vital signs stable--voiding qs--effective pain management with Motrin--breast feeding is going well.

## 2017-05-02 NOTE — PROGRESS NOTES
Patient ambulating without any complaints, voiding without problems. Pain controlled with Motrin. Bonding well with infant. Vital signs are stable. Mom and baby skin to skin and bonding well.

## 2017-05-02 NOTE — PROGRESS NOTES
Baby announcement.     [address] [facility]   Staff [address] [facility]   [phone]

## 2017-05-02 NOTE — PROGRESS NOTES
Post-Partum Day Number 1  Progress Note    Patient doing well post-partum without significant complaints. Voiding without difficulty, normal lochia. Breast feeding well. Emotionally stable. Breastfeeding: Infant Feeding: Breastmilk  Vitals:  Patient Vitals for the past 8 hrs:   BP Temp Pulse Resp SpO2   17 0340 100/65 98.3 °F (36.8 °C) 92 18 98 %     Temp (24hrs), Av.3 °F (36.8 °C), Min:98.2 °F (36.8 °C), Max:98.6 °F (37 °C)      Vital signs stable, afebrile. Exam:  Patient is in good general condition. Emotionally: appears to be stable  Fundus:  Firm and not tender. Lower extremities are negative for swelling, cords or tenderness. Lab/Data Review:  CBC: Lab Results   Component Value Date/Time    WBC 9.9 2017 11:50 AM    RBC 4.17 2017 11:50 AM    HGB 10.2 2017 06:05 AM    HCT 31.8 2017 06:05 AM    PLATELET 951  11:50 AM    Platelet cnt., External 346 2017     Lab results reviewed. For significant abnormal values and values requiring intervention, see assessment and plan. Assessment:Post Partum Day number 1 PT doing well. Circ permit signed. Plan:    Continue routine perineal care and maternal education. Plan discharge tomorrow if no problems occur.     Education:  Post partum instructions discussed                Stephanie Portillo MD  May 2, 2017

## 2017-05-02 NOTE — PROGRESS NOTES
2000 - Assumed care of patient. Patient in room sitting up in bed seemed comfortable. Patient unaccompanied breastfeeding. Introduced myself. Updated whiteboard. Explained plan of care for the shift. Completed head to toe assessment and fundal check. Usha pad changed to assess any increased bleeding. Assessment is uneventful. Patient denies pain or any other concerns will continue to monitor per protocol. 2115 - Patient up to ambulate to bathroom. Patient voided X1. Vital signs stable. Fundus firm at umbilicus small lochia. Patient denies pain. Patient in shower. Personal hygiene items provided. 2200 -   Patient transferred to room 3403. Assisted to bed. Side rails X2. Bedside table and call bell within reach. Reminded to call for  prior to void X 1  To complete documentation of 3 voids. 2219 - TRANSFER - OUT REPORT:    Verbal report given to YARY Toussaint RN (name) on [employer]  being transferred to [facility] room 3403(unit) for routine progression of care       Report consisted of patients Situation, Background, Assessment and   Recommendations(SBAR). Information from the following report(s) SBAR, Procedure Summary, Intake/Output and Recent Results was reviewed with the receiving nurse. Lines:   Peripheral IV 05/01/17 Right Forearm (Active)   Site Assessment Clean, dry, & intact 5/1/2017  4:15 PM   Phlebitis Assessment 0 5/1/2017  4:15 PM   Infiltration Assessment 0 5/1/2017  4:15 PM   Dressing Status Clean, dry, & intact 5/1/2017  4:15 PM   Dressing Type Tape;Transparent 5/1/2017  4:15 PM   Hub Color/Line Status Pink;Capped  5/1/2017  4:15 PM        Opportunity for questions and clarification was provided.

## 2017-05-03 VITALS
BODY MASS INDEX: 27.58 KG/M2 | OXYGEN SATURATION: 99 % | SYSTOLIC BLOOD PRESSURE: 102 MMHG | TEMPERATURE: 98.5 F | RESPIRATION RATE: 18 BRPM | WEIGHT: 182 LBS | DIASTOLIC BLOOD PRESSURE: 75 MMHG | HEIGHT: 68 IN | HEART RATE: 79 BPM

## 2017-05-03 PROBLEM — O34.219 VBAC, DELIVERED: Status: ACTIVE | Noted: 2017-05-03

## 2017-05-03 PROCEDURE — 74011250637 HC RX REV CODE- 250/637: Performed by: SPECIALIST

## 2017-05-03 RX ORDER — IBUPROFEN 800 MG/1
800 TABLET ORAL
Qty: 60 TAB | Refills: 0 | Status: SHIPPED | OUTPATIENT
Start: 2017-05-03

## 2017-05-03 RX ADMIN — IBUPROFEN 800 MG: 400 TABLET, FILM COATED ORAL at 11:48

## 2017-05-03 NOTE — DISCHARGE SUMMARY
Obstetrical Discharge Summary     Name: Adalgisa Steward MRN: [id number]  SSN: [id number]    YOB: 1975  Age: 39 y.o. Sex: female      Admit Date: 2017    Discharge Date: 5/3/2017     Admitting Physician: Tamica Leger MD     Attending Physician:  Hayley Nichole MD     * Admission Diagnoses: maternity  Labor and delivery, indication for care    * Discharge Diagnoses:   Information for the patient's :  Nirali Turner [[id number]]   Delivery of a 3.55 kg male infant via Vaginal Birth After   on 2017 at 6:11 PM  by . Apgars were 9 and 9. Additional Diagnoses:   Hospital Problems as of 5/3/2017  Date Reviewed: 5/3/2017          Codes Class Noted - Resolved POA    * (Principal), delivered ICD-10-CM: O34.219  ICD-9-CM: 654.21  5/3/2017 - Present No        RESOLVED: Labor and delivery, indication for care ICD-10-CM: O75.9  ICD-9-CM: 659.90  2017 - 5/3/2017 Unknown             Lab Results   Component Value Date/Time    ABO/Rh(D) A POSITIVE 2017 11:50 AM    Rubella, External immune 2017    GrBStrep, External positive  2017    ABO,Rh A positive  2017      Immunization History   Administered Date(s) Administered    DTaP 10/23/2014    Influenza Vaccine 10/23/2014       * Procedures:          * Discharge Condition: stable    * Hospital Course: Normal hospital course following the delivery. * Disposition: Home    Discharge Medications:   Current Discharge Medication List      START taking these medications    Details   ibuprofen (MOTRIN) 800 mg tablet Take 1 Tab by mouth every eight (8) hours as needed. Qty: 60 Tab, Refills: 0         CONTINUE these medications which have NOT CHANGED    Details   PNV WITH CA,NO.71/IRON/FA (PRENATAL VITAMIN 1+1 PO) Take  by mouth. * Follow-up Care/Patient Instructions:   Activity: Activity as tolerated, No sex for 6 weeks and No heavy lifting for 6 weeks  Diet: Regular Diet    Follow-up Information     Follow up With Details Ul. Yudith Rocha MD In 6 weeks  [address] [address] [facility]  [phone]             Signed By:  David Yanez CNM     May 3, 2017

## 2017-05-03 NOTE — LACTATION NOTE
Mother states baby is continuing to nurse well. General discussion. Encouraged to call later if needed.

## 2017-05-03 NOTE — PROGRESS NOTES
Progress Note    Patient: Brandyn Bueno MRN: [id number]     YOB: 1975  Age: 39 y.o. Subjective:     Postpartum Day: 2    The patient is feeling well. Pain is  well controlled with current medications. Urinary output is adequate. Baby is feeding via breast without difficulty. Objective:      Patient Vitals for the past 12 hrs:   Temp Pulse Resp BP SpO2   17 2345 98.1 °F (36.7 °C) 71 16 132/81 99 %       General:    alert   Lochia:  appropriate   Uterine Fundus:   firm @ umbilicus    Perineum:  Intact    DVT Evaluation:  No evidence of DVT seen on physical exam.       Assessment:     Delivery: vaginal birth after  ()    Plan:     Doing well postpartum vaginal delivery. Plan DC home today. Follow-up in Dr. Mariam Neal office in 6 weeks. Call prn. Current Discharge Medication List      START taking these medications    Details   ibuprofen (MOTRIN) 800 mg tablet Take 1 Tab by mouth every eight (8) hours as needed. Qty: 60 Tab, Refills: 0         CONTINUE these medications which have NOT CHANGED    Details   PNV WITH CA,NO.71/IRON/FA (PRENATAL VITAMIN 1+1 PO) Take  by mouth.              Signed By: Zander Monge CNM     May 3, 2017

## 2017-05-03 NOTE — ROUTINE PROCESS
Verbal shift change report given to Kady Magdaleno RN (oncoming nurse) by Nicki Varela RN (offgoing nurse). Report included the following information Kardex, Intake/Output, MAR and Recent Results. 0925--assessment done--planning discharge for today--voiding without difficulty--denies weakness when up--used Percocet x1, but patient reports she will have effective pain management with Motrin at home--has not had BM--high fiber/high fiber diet encouraged--breast feeding is going well--POC for discharge discussed--verbalizes understanding. 1130--discharge instructions reviewed and signed. 1215--discharged via wheelchair with baby in arms. Baby transported home in a car seat.

## 2017-05-03 NOTE — DISCHARGE INSTRUCTIONS

## 2017-05-03 NOTE — ROUTINE PROCESS
Bedside and Verbal shift change report given to Cristin Avalos RN (oncoming nurse) by Noelle Thomason RN (offgoing nurse). Report included the following information SBAR, Kardex, Intake/Output, MAR and Recent Results.

## 2017-05-03 NOTE — ROUTINE PROCESS
Vital signs within normal limits, pain is managed, mother is bonding with infant. Patient is preparing to discharge home today.